# Patient Record
Sex: MALE | Race: OTHER
[De-identification: names, ages, dates, MRNs, and addresses within clinical notes are randomized per-mention and may not be internally consistent; named-entity substitution may affect disease eponyms.]

---

## 2017-01-06 ENCOUNTER — HOSPITAL ENCOUNTER (OUTPATIENT)
Dept: HOSPITAL 72 - GAS | Age: 57
Discharge: HOME | End: 2017-01-06
Payer: COMMERCIAL

## 2017-01-06 VITALS — SYSTOLIC BLOOD PRESSURE: 123 MMHG | DIASTOLIC BLOOD PRESSURE: 60 MMHG

## 2017-01-06 VITALS — SYSTOLIC BLOOD PRESSURE: 143 MMHG | DIASTOLIC BLOOD PRESSURE: 81 MMHG

## 2017-01-06 VITALS — DIASTOLIC BLOOD PRESSURE: 64 MMHG | SYSTOLIC BLOOD PRESSURE: 121 MMHG

## 2017-01-06 VITALS — SYSTOLIC BLOOD PRESSURE: 119 MMHG | DIASTOLIC BLOOD PRESSURE: 75 MMHG

## 2017-01-06 VITALS — WEIGHT: 185 LBS | HEIGHT: 71 IN | BODY MASS INDEX: 25.9 KG/M2

## 2017-01-06 VITALS — SYSTOLIC BLOOD PRESSURE: 117 MMHG | DIASTOLIC BLOOD PRESSURE: 65 MMHG

## 2017-01-06 VITALS — SYSTOLIC BLOOD PRESSURE: 125 MMHG | DIASTOLIC BLOOD PRESSURE: 62 MMHG

## 2017-01-06 DIAGNOSIS — Z79.82: ICD-10-CM

## 2017-01-06 DIAGNOSIS — Z79.899: ICD-10-CM

## 2017-01-06 DIAGNOSIS — Q43.8: ICD-10-CM

## 2017-01-06 DIAGNOSIS — F17.200: ICD-10-CM

## 2017-01-06 DIAGNOSIS — Z12.11: Primary | ICD-10-CM

## 2017-01-06 PROCEDURE — 94150 VITAL CAPACITY TEST: CPT

## 2017-01-06 PROCEDURE — 45378 DIAGNOSTIC COLONOSCOPY: CPT

## 2017-01-06 PROCEDURE — 94003 VENT MGMT INPAT SUBQ DAY: CPT

## 2017-01-06 NOTE — PRE-PROCEDURE NOTE/ATTESTATION
Pre-Procedure Note/Attestation


Complete Prior to Procedure


Planned Procedure:  left


Procedure Narrative:


examination of the colon via colonoscopy for screening colon cancer.





Indications for Procedure


Pre-Operative Diagnosis:


R/O colon polyp/cancer.





Attestation


I attest that I discussed the nature of the procedure; its benefits; risks and 

complications; and alternatives (and the risks and benefits of such alternatives

), prior to the procedure, with the patient (or the patient's legal 

representative).





I attest that, if there was a reasonable possibility of needing a blood 

transfusion, the patient (or the patient's legal representative) was given the 

[place] [employer] standardized written summary, pursuant 

to the [employer] Blood Safety Act ([place] Health and Safety Code # 1645, as 

amended).





I attest that I re-evaluated the patient just prior to the surgery and that 

there has been no change in the patient's H&P, except as documented below:











PEPE,SAID Jan 6, 2017 10:06

## 2017-01-06 NOTE — 48 HOUR POST ANESTHESIA EVAL
Post Anesthesia Evaluation


Procedure:  Colonoscopy


Date of Evaluation:  Jan 6, 2017


Time of Evaluation:  11:25


Blood Pressure Systolic:  142


0:  77


Pulse Rate:  50


Respiratory Rate:  16


Temperature (Fahrenheit):  97


O2 Sat by Pulse Oximetry:  100


Airway:  patent


Nausea:  No


Vomiting:  No


Pain Intensity:  0


Hydration Status:  adequate


Cardiopulmonary Status:


stable


Mental Status/LOC:  other - drowsy


Follow-up Care/Observations:


per PACU protocol


Post-Anesthesia Complications:


none


Follow-up care needed:  N/A











MALI AGRAWAL M.D. Jan 6, 2017 07:44

## 2017-01-06 NOTE — IMMEDIATE POST-OP EVALUATION
Immediate Post-Op Evalulation


Immediate Post-Op Evalulation


Procedure:  Colonoscopy


Date of Evaluation:  Jan 6, 2017


Time of Evaluation:  10:56


IV Fluids:  400 ml


Blood Products:  0


Estimated Blood Loss:  0


Urinary Output:  nm


Blood Pressure Systolic:  143


Blood Pressure Diastolic:  91


Pulse Rate:  49


Respiratory Rate:  16


O2 Sat by Pulse Oximetry:  97


Temperature (Fahrenheit):  97.1


Pain Score (1-10):  0


Nausea:  No


Vomiting:  No


Complications


none


Patient Status:  reacts, patent, none


Hydration Status:  adequate


Drug:  none











MALI AGRAWAL M.D. Jan 6, 2017 07:42

## 2017-01-06 NOTE — OPERATIVE NOTE - DICTATED
DATE OF OPERATION:  01/06/2017



PROCEDURE:  Total colonoscopy.



PREOPERATIVE DIAGNOSIS:  Screening colonoscopy.



POSTOPERATIVE DIAGNOSIS:  Highly redundant twisted colon with poor

colonic preparation, otherwise completely normal total colonoscopy.



MEDICATION USED:  Per Dr. Cardenas, anesthesiologist.



INSTRUMENT:  GIF Olympus videocolonoscope.



DESCRIPTION OF PROCEDURE:  The patient after arriving in the

endoscopy unit, was told about risks and benefits of the procedure, which

he accepted and signed the informed consent.  He was then put on the left

lateral decubitus position.  After adequate IV sedation, scope gradually

entered into the anal area and a retroflexion maneuver was applied here,

which did not reveal any major pathology such as polyps, tumors,

hemorrhoids etc.  At this point, the scope was gradually passed through a

very redundant twisted left colon, which took significant amount of time

to pass through the scope and this area was filled with liquidy stool

making the examination difficult.  However, there was no any gross

pathology such as tumors, polyps, or strictures, bleeding site etc.

Gradually after irrigation of the colon in this area, scope reached toward

the splenic flexure, from there into transverse colon, hepatic flexure,

right colon all the way to the base of the cecum.  All these areas were

normal and the appendiceal opening was also visualized.  At this point,

within 6 minutes the scope was gradually pulled out and re-evaluation of

the colon did not reveal any other pathology.  The patient tolerated the

procedure well and left the endoscopy room in a good condition.









  ______________________________________________

  Said BEBA Frye





DR:  Chuck

D:  01/06/2017 10:51

T:  01/06/2017 13:12

JOB#:  [id number]

CC:

## 2017-01-06 NOTE — ENDOSCOPY PROCEDURE NOTE
Endoscopy Procedure Note


Indication for Procedure:  Screening colonoscopy


Procedures Performed:  colonoscopy - Highly redundant and twisted colon with 

poor prep, otherwise completely normal total colonoscopy.


Specimen:  none


Pt Tolerated Procedure Well:  Yes


Estimated Blood Loss:  none


Anesthesiologist:  Dr. Oneill


Anesthesia:  moderate sedation


Medication Given:  see anesthesia record


Implant(s) used?:  No


50 yrs or older w/o bx or poly:  Yes


10yrs. F/U not recommended:  Yes


10 yrs. F/U needed:  Yes


18 years or older w/prev. colo:  No


<3yrs. since last colonoscopy:  No


Med reason:<3 yrs.:  


System Reason:<3 yrs.:  











SARITA CANTU Jan 6, 2017 10:44

## 2017-01-06 NOTE — ANETHESIA PREOPERATIVE EVAL
Anesthesia Pre-op PMH/ROS


General


Date of Evaluation:  Jan 6, 2017


Time of Evaluation:  10:12


Anesthesiologist:  Mai


ASA Score:  ASA 2


Mallampati Score


Class I : Soft palate, uvula, fauces, pillars visible


Class II: Soft palate, uvula, fauces visible


Class III: Soft palate, base of uvula visible


Class IV: Only hard plate visible


Mallampati Classification:  Class I


Surgeon:  Uday


Diagnosis:  Colon screening


Surgical Procedure:  Screening colonoscopy


Anesthesia History:  other - no complic


Social History:  smoking


Family History:  no anesthesia problems


Allergies:  


Coded Allergies:  


     No Known Allergies (Unverified , 1/5/17)


Medications:  see eMAR





Past Medical History


Cardiovascular:  Reports: other





Anesthesia Pre-op Phys. Exam


Physician Exam





Last Vital Signs








  Date Time  Temp Pulse Resp B/P Pulse Ox O2 Delivery O2 Flow Rate FiO2


 


1/6/17 09:33 98.0 62 18 119/75 96 Room Air  








Constitutional:  NAD


Neurologic:  CN 2-12 intact


Cardiovascular:  RRR


Respiratory:  CTA





Airway Exam


Mallampati Score:  Class I


MO:  full


ROM:  full











MALI AGRAWAL M.D. Jan 6, 2017 10:23

## 2017-01-06 NOTE — SHORT STAY SURGERY H&P
History of Present Illness


History of Present Illness


Chief Complaint


screening colon


HPI


Josef Elias is a 56 year old male who was admitted on  for Colon Screening





Patient History


Allergies:  


Coded Allergies:  


     No Known Allergies (Unverified , 1/5/17)


PAST MEDICAL HISTORY:  


Past Surgeries:  


Social History:  





Medication History


Scheduled


Aspirin* (Aspir 81*), 81 MG ORAL DAILY, (Reported)


Atorvastatin Calcium* (Atorvastatin Calcium*), 40 MG ORAL BEDTIME, (Reported)


Omega-3/Dha/Epa/Fish Oil (Fish Oil 1,400 Mg Softgel), 1 EACH PO DA, (Reported)


Ubidecarenone (Coq10), 300 MG PO DA, (Reported)


[Vitamin B12 Complex], 1 TAB PO DA, (Reported)





Review of Systems


Cardiovascular:  Reports: no symptoms


Respiratory:  Reports: no symptoms


Skeletal:  Reports: no symptoms


Gastrointestinal:  Reports: no symptoms


Genitourinary:  Reports: no symptoms


Neurologic:  Reports: no symptoms


Endocrine:  Reports: no symptoms


Hematologic:  Reports: no symptoms





Physical Exam


Vital Signs





Last Vital Signs








  Date Time  Temp Pulse Resp B/P Pulse Ox O2 Delivery O2 Flow Rate FiO2


 


1/6/17 09:33 98.0 62 18 119/75 96 Room Air  








Skin:  normal


HENT:  normal


Heart:  normal


Lungs:  normal


Abdomen:  normal


Extremities:  normal


Genitourinary:  normal





Plan


Plan of Care


Total colonoscopy.


Preop Interventions


None.


Summary of Findings


See the report.


Final Diagnosis:  


Attestation


Are the patient's medical conditions optimized for surgery?


Attestation Response:  yes











SARITA CANTU Jan 6, 2017 10:05

## 2017-01-06 NOTE — DISCHARGE INSTRUCTIONS
Discharge Instructions


Discharge Instructions


Follow up with:  There was normal colonoscopy no need for office follow up.





For Congestive Heart Failure


Reminder


Report to your physician any weight gain of 5 pounds or more in one week.











PEPE,SAID Jan 6, 2017 10:45

## 2023-08-09 ENCOUNTER — HOSPITAL ENCOUNTER (EMERGENCY)
Dept: HOSPITAL 12 - ER | Age: 63
Discharge: HOME | End: 2023-08-09
Payer: COMMERCIAL

## 2023-08-09 VITALS — WEIGHT: 185 LBS | HEIGHT: 71 IN | BODY MASS INDEX: 25.9 KG/M2

## 2023-08-09 VITALS — DIASTOLIC BLOOD PRESSURE: 71 MMHG | SYSTOLIC BLOOD PRESSURE: 135 MMHG

## 2023-08-09 VITALS — OXYGEN SATURATION: 96 %
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
## 2023-08-09 DIAGNOSIS — E78.5: ICD-10-CM

## 2023-08-09 DIAGNOSIS — Z79.899: ICD-10-CM
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
## 2023-08-09 DIAGNOSIS — S01.01XA: Primary | ICD-10-CM

## 2023-08-09 DIAGNOSIS — S06.0X0A: ICD-10-CM

## 2023-08-09 PROCEDURE — 90471 IMMUNIZATION ADMIN: CPT

## 2023-08-09 PROCEDURE — 70450 CT HEAD/BRAIN W/O DYE: CPT

## 2023-08-09 PROCEDURE — 90715 TDAP VACCINE 7 YRS/> IM: CPT

## 2023-08-09 PROCEDURE — 99285 EMERGENCY DEPT VISIT HI MDM: CPT

## 2023-08-09 PROCEDURE — A9150 MISC/EXPER NON-PRESCRIPT DRU: HCPCS

## 2023-08-09 PROCEDURE — 12002 RPR S/N/AX/GEN/TRNK2.6-7.5CM: CPT

## 2023-08-09 PROCEDURE — A4663 DIALYSIS BLOOD PRESSURE CUFF: HCPCS
